# Patient Record
Sex: FEMALE | Race: WHITE | Employment: FULL TIME | URBAN - METROPOLITAN AREA
[De-identification: names, ages, dates, MRNs, and addresses within clinical notes are randomized per-mention and may not be internally consistent; named-entity substitution may affect disease eponyms.]

---

## 2024-04-16 NOTE — PROGRESS NOTES
Dermatology Patient Note  [facility] DERMATOLOGY  [address]  [address]  [place] [address]  Dept: [phone]  Dept Fax: [phone]      VISITDATE: 4/18/2024   REFERRING PROVIDER: No ref. provider found      Poly Lehman is a 38 y.o. female  who presents today in the office for:    New Patient (Patient presents in the office as a new patient for a FBSE- No Hx of skin cancer. No family hx of skin cancer. Concerned with a spot on her right side mid back. Hs been there for 2 years. No itching, pain, or bleeding from this. She is not sure if this has changed in size or shape but does think the color has changed from brown to pink. )      HISTORY OF PRESENT ILLNESS:  Patient presents to the office today as a new patient to establish care. She is present for a FBSE. She is concerned with a spot on her right mid back. This has been there for two years. She denies itching, pain, or bleeding, but admits this gets tender at times. She is not sure if this has changed in size or shape, but she does think the color has changed from brown to pink. No personal or family history of skin cancer. She also has c/o hair thinning. She admits she went through a stressful event about a year ago, causing her to lose 20 lbs in 2 months and a lot of hair and have spots of baldness. Her hair is finally starting to grow back and slowly starting to thicken back up. She has also been dealing with deep, cystic acne in her beard distribution and on her back, along with excessive hair growth.    MEDICAL PROBLEMS:  There are no problems to display for this patient.      CURRENT MEDICATIONS:   Current Outpatient Medications   Medication Sig Dispense Refill    benzoyl peroxide 5 % external liquid Use to wash torso daily. 227 g 5    spironolactone (ALDACTONE) 50 MG tablet Take 1 tablet by mouth daily 90 tablet 1    levothyroxine (SYNTHROID) 150 MCG tablet Take 1 tablet by

## 2024-04-18 ENCOUNTER — OFFICE VISIT (OUTPATIENT)
Dept: DERMATOLOGY | Age: 39
End: 2024-04-18
Payer: COMMERCIAL

## 2024-04-18 VITALS
WEIGHT: 162 LBS | BODY MASS INDEX: 26.03 KG/M2 | HEART RATE: 80 BPM | DIASTOLIC BLOOD PRESSURE: 90 MMHG | TEMPERATURE: 98.2 F | HEIGHT: 66 IN | SYSTOLIC BLOOD PRESSURE: 128 MMHG

## 2024-04-18 DIAGNOSIS — D22.9 COMPOUND NEVUS: Primary | ICD-10-CM

## 2024-04-18 DIAGNOSIS — L68.0 HIRSUTISM: ICD-10-CM

## 2024-04-18 DIAGNOSIS — L81.3 CAFÉ AU LAIT SPOT: ICD-10-CM

## 2024-04-18 DIAGNOSIS — L70.0 ACNE VULGARIS: ICD-10-CM

## 2024-04-18 PROCEDURE — 99204 OFFICE O/P NEW MOD 45 MIN: CPT | Performed by: PHYSICIAN ASSISTANT

## 2024-04-18 RX ORDER — LEVOTHYROXINE SODIUM 0.15 MG/1
150 TABLET ORAL EVERY MORNING
COMMUNITY

## 2024-04-18 RX ORDER — SPIRONOLACTONE 50 MG/1
50 TABLET, FILM COATED ORAL DAILY
Qty: 90 TABLET | Refills: 1 | Status: SHIPPED | OUTPATIENT
Start: 2024-04-18

## 2024-04-18 NOTE — PATIENT INSTRUCTIONS
- Start BPO wash; Apply daily to back  - Start spironolactone 50 MG once daily  - Discussed spironolactone with patient. The patient was informed of common side effects such as increased urination/urinary frequency, menstrual irregularities with mid-cycle spotting, breast tenderness, decreased libido, fatigue, headache, and dizziness. Patient informed to stop taking medication and to immediately report any new onset muscle cramps or weakness, or palpitations.  Patient informed that pregnancy needs to be avoided while on this medication. Discussed risk of elevated potassium and the need to stay away from potassium supplements while on the medication.  _______________________________________________________________  - Cosmetic procedures provided dermatologist recommendations  [facility]  Located in: [facility]  Address: [address], [place] [address]  Phone: [phone]